# Patient Record
Sex: FEMALE | Race: WHITE | NOT HISPANIC OR LATINO | Employment: STUDENT | URBAN - NONMETROPOLITAN AREA
[De-identification: names, ages, dates, MRNs, and addresses within clinical notes are randomized per-mention and may not be internally consistent; named-entity substitution may affect disease eponyms.]

---

## 2023-02-12 PROBLEM — F41.9 ANXIETY: Status: ACTIVE | Noted: 2023-02-12

## 2023-02-12 PROBLEM — R53.83 FATIGUE: Status: ACTIVE | Noted: 2023-02-12

## 2023-02-24 PROBLEM — F32.1 DEPRESSION, MAJOR, SINGLE EPISODE, MODERATE (MULTI): Status: ACTIVE | Noted: 2023-02-24

## 2023-03-21 ENCOUNTER — APPOINTMENT (OUTPATIENT)
Dept: PRIMARY CARE | Facility: CLINIC | Age: 28
End: 2023-03-21
Payer: COMMERCIAL

## 2023-04-05 ENCOUNTER — OFFICE VISIT (OUTPATIENT)
Dept: PRIMARY CARE | Facility: CLINIC | Age: 28
End: 2023-04-05
Payer: COMMERCIAL

## 2023-04-05 VITALS
DIASTOLIC BLOOD PRESSURE: 66 MMHG | SYSTOLIC BLOOD PRESSURE: 116 MMHG | OXYGEN SATURATION: 99 % | BODY MASS INDEX: 23.77 KG/M2 | HEIGHT: 61 IN | HEART RATE: 61 BPM | WEIGHT: 125.9 LBS

## 2023-04-05 DIAGNOSIS — J30.2 SEASONAL ALLERGIES: ICD-10-CM

## 2023-04-05 DIAGNOSIS — F41.9 ANXIETY: Primary | ICD-10-CM

## 2023-04-05 DIAGNOSIS — F32.1 DEPRESSION, MAJOR, SINGLE EPISODE, MODERATE (MULTI): ICD-10-CM

## 2023-04-05 PROCEDURE — 1036F TOBACCO NON-USER: CPT | Performed by: INTERNAL MEDICINE

## 2023-04-05 PROCEDURE — 99213 OFFICE O/P EST LOW 20 MIN: CPT | Performed by: INTERNAL MEDICINE

## 2023-04-05 RX ORDER — HYDROXYZINE HYDROCHLORIDE 25 MG/1
25 TABLET, FILM COATED ORAL 3 TIMES DAILY
Qty: 90 TABLET | Refills: 2 | Status: SHIPPED | OUTPATIENT
Start: 2023-04-05 | End: 2023-05-05

## 2023-04-05 RX ORDER — FLUOXETINE HYDROCHLORIDE 40 MG/1
40 CAPSULE ORAL DAILY
Qty: 30 CAPSULE | Refills: 5 | Status: SHIPPED | OUTPATIENT
Start: 2023-04-05 | End: 2023-10-02

## 2023-04-05 ASSESSMENT — ENCOUNTER SYMPTOMS
PALPITATIONS: 0
SHORTNESS OF BREATH: 0
NAUSEA: 0
CHEST TIGHTNESS: 0
RHINORRHEA: 1
ARTHRALGIAS: 0
VOMITING: 0
BLOOD IN STOOL: 0
BACK PAIN: 0
COUGH: 0
FATIGUE: 0
DIARRHEA: 0
ABDOMINAL PAIN: 0
WHEEZING: 0

## 2023-04-05 NOTE — PROGRESS NOTES
Subjective   Patient ID: Rose Hollingsworth is a 27 y.o. female who presents for No chief complaint on file..  HPI  She is here today for follow-up.  We last saw her on February 7 and had increased the dose of her fluoxetine from 20 mg daily up to 40 mg daily.  She states she has been doing so much better.  She states there are times when she feels a little bit anxious and wondered if there is something she could take at those times.  I am going to give her hydroxyzine and we talked about the medication and potential side effects.  She also continues to see her counselor regularly and I believe she is doing well with those sessions.  She also states she has been feeling some allergy type symptoms with itchy eyes and runny nose and sometimes itchy throat.  She states her mucus is clear.  We discussed how 1 can develop seasonal allergies as an adult and I am recommending over-the-counter medications which I will list in her depart summary.  Otherwise she is doing well and we decided we can safely see her back in approximately 6 months and sooner if any problems  Review of Systems   Constitutional:  Negative for fatigue.   HENT:  Positive for congestion and rhinorrhea.    Respiratory:  Negative for cough, chest tightness, shortness of breath and wheezing.    Cardiovascular:  Negative for chest pain, palpitations and leg swelling.   Gastrointestinal:  Negative for abdominal pain, blood in stool, diarrhea, nausea and vomiting.   Musculoskeletal:  Negative for arthralgias and back pain.     Objective   Physical Exam  Vitals and nursing note reviewed.   Constitutional:       General: She is not in acute distress.     Appearance: Normal appearance.   HENT:      Head: Normocephalic and atraumatic.   Eyes:      Conjunctiva/sclera: Conjunctivae normal.   Cardiovascular:      Rate and Rhythm: Normal rate and regular rhythm.      Heart sounds: Normal heart sounds.   Pulmonary:      Effort: No respiratory distress.      Breath  sounds: No wheezing.   Abdominal:      Palpations: Abdomen is soft.      Tenderness: There is no abdominal tenderness. There is no guarding.   Musculoskeletal:         General: No swelling. Normal range of motion.   Skin:     General: Skin is warm and dry.   Neurological:      General: No focal deficit present.      Mental Status: She is alert and oriented to person, place, and time.   Psychiatric:         Behavior: Behavior normal.       Assessment/Plan   Problem List Items Addressed This Visit          Other    Anxiety - Primary     -I gave her a prescription for hydroxyzine and we talked about side effects such as drowsiness.  She will let us know if she is not doing well         Depression, major, single episode, moderate (CMS/HCC)     -Doing well  -We will continue with counseling  -She will continue with fluoxetine 40 mg daily and we will see her back in 6 months         Seasonal allergies     -She will try over-the-counter nonsedating antihistamines or Flonase and call if they are not effective               Chely Gatica, DO

## 2023-04-05 NOTE — PATIENT INSTRUCTIONS
-We discussed your symptoms of seasonal allergies and I am recommending you try over-the-counter medicines such as Claritin which is also known as loratadine or Allegra which is also known as fexofenadine.  You can also try Flonase nasal spray as this can provide a lot of symptom relief for nasal stuffiness and runny nose.  You can even get the generic versions of these medicines as they are just as effective.  There is also a decongestant version of the Claritin and Allegra but you have to purchase these at the front counter with a 's license.  I would try the plain versions first and please let us know how things are going  -We would like to see you back in 6 months and please call sooner if things or not going well  
no

## 2023-04-05 NOTE — ASSESSMENT & PLAN NOTE
-I gave her a prescription for hydroxyzine and we talked about side effects such as drowsiness.  She will let us know if she is not doing well

## 2023-04-05 NOTE — ASSESSMENT & PLAN NOTE
-She will try over-the-counter nonsedating antihistamines or Flonase and call if they are not effective

## 2023-04-05 NOTE — ASSESSMENT & PLAN NOTE
-Doing well  -We will continue with counseling  -She will continue with fluoxetine 40 mg daily and we will see her back in 6 months

## 2024-03-06 ENCOUNTER — OFFICE VISIT (OUTPATIENT)
Dept: URGENT CARE | Facility: CLINIC | Age: 29
End: 2024-03-06
Payer: COMMERCIAL

## 2024-03-06 VITALS
SYSTOLIC BLOOD PRESSURE: 132 MMHG | WEIGHT: 130 LBS | HEIGHT: 61 IN | RESPIRATION RATE: 16 BRPM | BODY MASS INDEX: 24.55 KG/M2 | DIASTOLIC BLOOD PRESSURE: 74 MMHG | TEMPERATURE: 98.1 F | OXYGEN SATURATION: 98 % | HEART RATE: 98 BPM

## 2024-03-06 DIAGNOSIS — J01.90 ACUTE RHINOSINUSITIS: Primary | ICD-10-CM

## 2024-03-06 LAB
POC TRIPLEX FLU A-AG: NORMAL
POC TRIPLEX FLU B-AG: NORMAL
POC TRIPLEX SARSCOV-2 AG: NORMAL

## 2024-03-06 PROCEDURE — 99212 OFFICE O/P EST SF 10 MIN: CPT | Performed by: PHYSICIAN ASSISTANT

## 2024-03-06 PROCEDURE — 87428 SARSCOV & INF VIR A&B AG IA: CPT | Performed by: PHYSICIAN ASSISTANT

## 2024-03-06 RX ORDER — IPRATROPIUM BROMIDE 42 UG/1
2 SPRAY, METERED NASAL 4 TIMES DAILY
Qty: 15 ML | Refills: 0 | Status: SHIPPED | OUTPATIENT
Start: 2024-03-06 | End: 2024-03-13

## 2024-03-06 NOTE — PROGRESS NOTES
[facility] NOTE:      Name: Rose Hollingsworth, 28 y.o.    CSN:[id number]   MRN:[id number]    PCP: Chely Gatica, DO    ALL:    Allergies   Allergen Reactions    Morphine Hives       History:    Chief Complaint: Sore Throat (SORE THROAT AND CONGESTION.//CONGESTION FOR 3 DAYS AND SORE THROAT SENSE YESTERDAY.)    Encounter Date: 3/6/2024  10:12hrs    HPI: The history was obtained from the patient. Rose is a 28 y.o. female, who presents with a chief complaint of Sore Throat (SORE THROAT AND CONGESTION.//CONGESTION FOR 3 DAYS AND SORE THROAT SENSE YESTERDAY.)     She has been drinking plenty of fluids, has held back on using any other medication.    She has some sinus congestion and some posterior drainage at times, she denies any notable exertional dyspnea, fever or chills.    PMHx:    Past Medical History:   Diagnosis Date    Calculus of kidney     Multiple kidney stones    Gestational diabetes mellitus in pregnancy, diet controlled 10/31/2022    Diet controlled gestational diabetes mellitus (GDM) in third trimester    Unspecified pre-eclampsia, third trimester 10/31/2022    Pre-eclampsia in third trimester              Current Outpatient Medications   Medication Sig Dispense Refill    FLUoxetine (PROzac) 40 mg capsule Take 1 capsule (40 mg) by mouth once daily. 30 capsule 5    hydrOXYzine HCL (Atarax) 25 mg tablet Take 1 tablet (25 mg) by mouth in the morning and 1 tablet (25 mg) in the evening and 1 tablet (25 mg) before bedtime. 90 tablet 2    ipratropium (Atrovent) 42 mcg (0.06 %) nasal spray Administer 2 sprays into each nostril 4 times a day for 7 days. 15 mL 0    MULTIVITAMIN ORAL Take by mouth.      norethindrone (Micronor) 0.35 mg tablet Take 1 tablet (0.35 mg) by mouth once daily.       No current facility-administered medications for this visit.         PMSx:    Past Surgical History:   Procedure Laterality Date    OTHER SURGICAL HISTORY  11/19/2019    Cystoscopy        Fam Hx:   Family History   Problem Relation Name Age of Onset    Heart disease Mother      Heart block Mother      Anxiety disorder Mother      Diabetes type II Mother      Heart disease Father      Heart block Father      COPD Other Grandparent     Anxiety disorder Other Grandparent     Emphysema Other Grandparent     Diabetes type II Other Grandparent        SOC. Hx:     Social History     Socioeconomic History    Marital status: Single     Spouse name: Not on file    Number of children: Not on file    Years of education: Not on file    Highest education level: Not on file   Occupational History    Not on file   Tobacco Use    Smoking status: Never    Smokeless tobacco: Never   Substance and Sexual Activity    Alcohol use: Not Currently    Drug use: Not Currently    Sexual activity: Not on file   Other Topics Concern    Not on file   Social History Narrative    Not on file     Social Determinants of Health     Financial Resource Strain: Not on file   Food Insecurity: Not on file   Transportation Needs: Not on file   Physical Activity: Not on file   Stress: Not on file   Social Connections: Not on file   Intimate Partner Violence: Not on file   Housing Stability: Not on file         Vitals:    03/06/24 0936   BP: 132/74   Pulse: 98   Resp: 16   Temp: 36.7 °C (98.1 °F)   SpO2: 98%     59 kg (130 lb)          Physical Exam  Constitutional:       Appearance: Normal appearance. She is normal weight.   HENT:      Head: Normocephalic and atraumatic.      Nose: Congestion present.      Right Turbinates: Enlarged and swollen.      Left Turbinates: Enlarged and swollen.      Mouth/Throat:      Mouth: Mucous membranes are moist.      Dentition: No gum lesions.      Palate: No lesions.      Pharynx: Uvula midline. Posterior oropharyngeal erythema present. No pharyngeal swelling.   Eyes:      Extraocular Movements: Extraocular movements intact.   Cardiovascular:      Rate and Rhythm: Normal rate and regular rhythm.    Pulmonary:      Effort: Pulmonary effort is normal.      Breath sounds: Normal breath sounds. No decreased breath sounds, wheezing or rhonchi.   Abdominal:      General: Abdomen is flat.   Musculoskeletal:         General: Normal range of motion.      Cervical back: Normal range of motion and neck supple.      Comments: Grossly normal strength   Skin:     General: Skin is warm.      Capillary Refill: Capillary refill takes less than 2 seconds.      Findings: No rash or wound.   Neurological:      Mental Status: She is alert and oriented to person, place, and time.   Psychiatric:         Behavior: Behavior normal.         LABORATORY @ RADIOLOGICAL IMAGING (if done):     Results for orders placed or performed in visit on 03/06/24 (from the past 24 hour(s))   POCT BD Veritor Triplex Ag   Result Value Ref Range    POC Triplex SARS-CoV-2 Ag  Presumptive negative for Triplex SARS-CoV-2 (no antigen detected)     Presumptive negative for Triplex SARS-CoV-2 (no antigen detected)    POC Triplex Flu A-Ag  Presumptive negative for Triplex FLU A (no antigen detected)     Presumptive negative for Triplex FLU A (no antigen detected)    POC Triplex Flu B-Ag  Presumptive negative for Triplex FLU B (no antigen detected)     Presumptive negative for Triplex FLU B (no antigen detected)        COURSE/MEDICAL DECISION MAKING:    Rose is a 28 y.o., who presents with a working diagnosis of   1. Acute rhinosinusitis     with a differential to include: Influenza, parainfluenza, rhinovirus, adenovirus, metapneumovirus, coronavirus, COVID-19, postnasal drip, strep pharyngitis, GERD, retropharyngeal abscess, tonsillitis, adenitis, seasonal allergies    1) URI with cough/congestion: supportive care recommended, discussed use of OTC analgesics APAP/NSAID for fever/pain control, discussed hydration & when to seek re-evaluation.        Jordan Walters PA-C   Advanced Practice Provider  [facility]

## 2024-07-17 ENCOUNTER — APPOINTMENT (OUTPATIENT)
Dept: PRIMARY CARE | Facility: CLINIC | Age: 29
End: 2024-07-17
Payer: COMMERCIAL

## 2024-07-17 VITALS
HEART RATE: 84 BPM | HEIGHT: 61 IN | WEIGHT: 128.06 LBS | BODY MASS INDEX: 24.18 KG/M2 | DIASTOLIC BLOOD PRESSURE: 74 MMHG | SYSTOLIC BLOOD PRESSURE: 120 MMHG

## 2024-07-17 DIAGNOSIS — Z00.00 ENCOUNTER FOR WELL ADULT EXAM WITHOUT ABNORMAL FINDINGS: ICD-10-CM

## 2024-07-17 DIAGNOSIS — Z13.220 LIPID SCREENING: Primary | ICD-10-CM

## 2024-07-17 PROCEDURE — 1036F TOBACCO NON-USER: CPT | Performed by: INTERNAL MEDICINE

## 2024-07-17 PROCEDURE — 99395 PREV VISIT EST AGE 18-39: CPT | Performed by: INTERNAL MEDICINE

## 2024-07-17 PROCEDURE — 3008F BODY MASS INDEX DOCD: CPT | Performed by: INTERNAL MEDICINE

## 2024-07-17 RX ORDER — SERTRALINE HYDROCHLORIDE 50 MG/1
50 TABLET, FILM COATED ORAL DAILY
COMMUNITY

## 2024-07-17 ASSESSMENT — ENCOUNTER SYMPTOMS
ARTHRALGIAS: 0
NAUSEA: 0
PALPITATIONS: 0
UNEXPECTED WEIGHT CHANGE: 0
FATIGUE: 0
DIARRHEA: 0
ABDOMINAL PAIN: 0
COUGH: 0
BACK PAIN: 0
SHORTNESS OF BREATH: 0
VOMITING: 0
WHEEZING: 0
CHEST TIGHTNESS: 0
BLOOD IN STOOL: 0

## 2024-07-17 NOTE — PROGRESS NOTES
Subjective   Patient ID: Rose Hollingsworth is a 28 y.o. female who presents for No chief complaint on file..  HPI  She is here today for a wellness visit.  We last saw her in April 2023.  She is looking well and she also reports feeling well.  She is seeing a neuropsychiatric nurse and she has been switched from fluoxetine to sertraline.  She states she is doing well but she and her gynecologist are looking into the possibility of PMDD.  We did conduct a review of systems and overall she appears quite healthy.  On today's physical exam we did find a mole at the lower portion of the mid back region.  She states has been there for many years but because of its location it is difficult to monitor.  I then proceeded to take a photo with her phone and I put a millimeter rule next to it so that she can use this for a point of reference and examining her mole in the future.  I told her that we generally recommend checking moles once a month and if she sees any changes she would simply call and we can refer her to dermatology.  We also realized that she has never had a cholesterol determination so I have ordered a screening lipid profile and have agreed to contact her with results.  Otherwise if everything goes according to plan she would simply come in once a year for her wellness checkup.  Review of Systems   Constitutional:  Negative for fatigue and unexpected weight change.   Respiratory:  Negative for cough, chest tightness, shortness of breath and wheezing.    Cardiovascular:  Negative for chest pain, palpitations and leg swelling.   Gastrointestinal:  Negative for abdominal pain, blood in stool, diarrhea, nausea and vomiting.   Musculoskeletal:  Negative for arthralgias and back pain.     Objective   Physical Exam  Vitals and nursing note reviewed.   Constitutional:       General: She is not in acute distress.     Appearance: Normal appearance.   HENT:      Head: Normocephalic and atraumatic.   Eyes:       Conjunctiva/sclera: Conjunctivae normal.   Cardiovascular:      Rate and Rhythm: Normal rate and regular rhythm.      Heart sounds: Normal heart sounds.   Pulmonary:      Effort: No respiratory distress.      Breath sounds: No wheezing.   Abdominal:      Palpations: Abdomen is soft.      Tenderness: There is no abdominal tenderness. There is no guarding.   Musculoskeletal:         General: No swelling. Normal range of motion.   Skin:     General: Skin is warm and dry.   Neurological:      General: No focal deficit present.      Mental Status: She is alert and oriented to person, place, and time.   Psychiatric:         Behavior: Behavior normal.       Assessment/Plan   Problem List Items Addressed This Visit             ICD-10-CM    Lipid screening - Primary Z13.220    Relevant Orders    Lipid panel          Chely Gatica DO

## 2024-07-17 NOTE — PATIENT INSTRUCTIONS
As we discussed we have treated today's visit as a wellness visit and you appear to be doing quite well  Please keep an eye on your moles and freckles and if you see any changes occurring please let me know as we would refer you to dermatology  I also ordered a fasting lipid profile so please try to go at your earliest convenience and once results are known I will contact you.  If you have abnormalities with your cholesterol I will give you advice on dietary changes and we would want to check it at some point in the future.  Otherwise if it looks great you will not need another checkup on your cholesterol for 5 years.

## 2024-07-18 ENCOUNTER — LAB (OUTPATIENT)
Dept: LAB | Facility: LAB | Age: 29
End: 2024-07-18
Payer: COMMERCIAL

## 2024-07-18 ENCOUNTER — OFFICE VISIT (OUTPATIENT)
Dept: PRIMARY CARE | Facility: CLINIC | Age: 29
End: 2024-07-18
Payer: COMMERCIAL

## 2024-07-18 VITALS
HEART RATE: 84 BPM | OXYGEN SATURATION: 98 % | SYSTOLIC BLOOD PRESSURE: 156 MMHG | HEIGHT: 61 IN | BODY MASS INDEX: 24.17 KG/M2 | DIASTOLIC BLOOD PRESSURE: 102 MMHG | WEIGHT: 128 LBS

## 2024-07-18 DIAGNOSIS — Z13.220 LIPID SCREENING: ICD-10-CM

## 2024-07-18 DIAGNOSIS — R51.9 ACUTE NONINTRACTABLE HEADACHE, UNSPECIFIED HEADACHE TYPE: ICD-10-CM

## 2024-07-18 DIAGNOSIS — R03.0 ELEVATED BLOOD PRESSURE READING: Primary | ICD-10-CM

## 2024-07-18 PROBLEM — I15.8 OTHER SECONDARY HYPERTENSION: Status: ACTIVE | Noted: 2024-07-18

## 2024-07-18 LAB
CHOLEST SERPL-MCNC: 162 MG/DL (ref 0–199)
CHOLESTEROL/HDL RATIO: 3.3
HDLC SERPL-MCNC: 49 MG/DL
LDLC SERPL CALC-MCNC: 103 MG/DL
NON HDL CHOLESTEROL: 113 MG/DL (ref 0–149)
TRIGL SERPL-MCNC: 51 MG/DL (ref 0–149)
VLDL: 10 MG/DL (ref 0–40)

## 2024-07-18 PROCEDURE — 3008F BODY MASS INDEX DOCD: CPT | Performed by: INTERNAL MEDICINE

## 2024-07-18 PROCEDURE — 1036F TOBACCO NON-USER: CPT | Performed by: INTERNAL MEDICINE

## 2024-07-18 PROCEDURE — 80061 LIPID PANEL: CPT

## 2024-07-18 PROCEDURE — 99213 OFFICE O/P EST LOW 20 MIN: CPT | Performed by: INTERNAL MEDICINE

## 2024-07-18 PROCEDURE — 3077F SYST BP >= 140 MM HG: CPT | Performed by: INTERNAL MEDICINE

## 2024-07-18 PROCEDURE — 3080F DIAST BP >= 90 MM HG: CPT | Performed by: INTERNAL MEDICINE

## 2024-07-18 PROCEDURE — 36415 COLL VENOUS BLD VENIPUNCTURE: CPT

## 2024-07-18 RX ORDER — ATENOLOL 25 MG/1
25 TABLET ORAL DAILY
Qty: 30 TABLET | Refills: 5 | Status: SHIPPED | OUTPATIENT
Start: 2024-07-18 | End: 2025-01-14

## 2024-07-18 NOTE — PROGRESS NOTES
Subjective   Patient ID: Rose Hollingsworth is a 28 y.o. female who presents for Hypertension.  She is here today for evaluation because today she started feeling a headache and when she checked her blood pressure at home it was significantly elevated.  Today her blood pressure is high for us as well.  Ironically we saw her yesterday and her blood pressure was fine.  She does remind me that several years ago she did develop preeclampsia but after her delivery she was fine.  She states that she does occasionally get headaches and they really bother her.  We talked about blood pressure causing headaches or a headache causing the blood pressure to go up.  I have decided to give her a low-dose of atenolol as I feel this would help several things.  It would help with headaches and help with today's blood pressure as well as help with possibly a little bit of anxiety.  She does admit to feeling a little anxious today.  She is no longer in the family-planning stages and we will have her give us an update tomorrow and see her back in a couple of weeks after being on the new medication.    Objective   Physical Exam  Vitals and nursing note reviewed.   Constitutional:       General: She is not in acute distress.     Appearance: Normal appearance.   HENT:      Head: Normocephalic and atraumatic.   Eyes:      Conjunctiva/sclera: Conjunctivae normal.   Cardiovascular:      Rate and Rhythm: Normal rate and regular rhythm.      Heart sounds: Normal heart sounds.   Pulmonary:      Effort: No respiratory distress.      Breath sounds: No wheezing.   Abdominal:      Palpations: Abdomen is soft.      Tenderness: There is no abdominal tenderness. There is no guarding.   Musculoskeletal:         General: No swelling. Normal range of motion.   Skin:     General: Skin is warm and dry.   Neurological:      General: No focal deficit present.      Mental Status: She is alert and oriented to person, place, and time.   Psychiatric:          Behavior: Behavior normal.       Assessment/Plan   Problem List Items Addressed This Visit             ICD-10-CM    Elevated blood pressure reading - Primary R03.0     -She will continue monitoring at home with her personal blood pressure monitor  -I am adding atenolol 25 mg daily in the hopes of preventing headaches and helping with blood pressure as well as anxiety         Relevant Medications    atenolol (Tenormin) 25 mg tablet    Other Relevant Orders    Basic Metabolic Panel    Acute nonintractable headache R51.9    Relevant Medications    atenolol (Tenormin) 25 mg tablet          Chely Gatica,

## 2024-07-18 NOTE — PATIENT INSTRUCTIONS
As we discussed I agree that your blood pressure is too high today and the question is did your headache because the high blood pressure or perhaps anxiety or are you having a headache because your blood pressure is high  Ironically your blood pressure was good yesterday  I will be giving you a prescription for atenolol 25 mg daily.  Atenolol is known as a beta-blocker and it can help quite significantly with preventing headaches as well as lowering blood pressure.  Atenolol can also help with anxiety.  Please start this medication today and give us an update tomorrow morning on your blood pressure reading.  I would like for you to sign up for [employer] and that way I can message you directly.  Please keep in mind that I will be doing messaging up until the noon hour tomorrow.  You can also call the office in the morning as well and leave a message  I would like to see you back in follow-up since we have this new medication and please do not hesitate to reach out if you are not doing well

## 2024-07-18 NOTE — RESULT ENCOUNTER NOTE
Please call Abbie and let her know that her cholesterol profile came back looking very good.  We could mail her a copy if she would like because it does not look like she is on the portal.  Please encourage her to sign up for the portal as she can then see her lab test results online.  If she has any questions about her cholesterol please encourage her to contact me.  Thank you

## 2024-07-18 NOTE — ASSESSMENT & PLAN NOTE
-She will continue monitoring at home with her personal blood pressure monitor  -I am adding atenolol 25 mg daily in the hopes of preventing headaches and helping with blood pressure as well as anxiety

## 2024-07-19 ENCOUNTER — LAB (OUTPATIENT)
Dept: LAB | Facility: LAB | Age: 29
End: 2024-07-19
Payer: COMMERCIAL

## 2024-07-19 DIAGNOSIS — R03.0 ELEVATED BLOOD PRESSURE READING: ICD-10-CM

## 2024-07-19 LAB
ANION GAP SERPL CALC-SCNC: 10 MMOL/L (ref 10–20)
BUN SERPL-MCNC: 15 MG/DL (ref 6–23)
CALCIUM SERPL-MCNC: 9.7 MG/DL (ref 8.6–10.3)
CHLORIDE SERPL-SCNC: 106 MMOL/L (ref 98–107)
CO2 SERPL-SCNC: 29 MMOL/L (ref 21–32)
CREAT SERPL-MCNC: 0.87 MG/DL (ref 0.5–1.05)
EGFRCR SERPLBLD CKD-EPI 2021: >90 ML/MIN/1.73M*2
GLUCOSE SERPL-MCNC: 84 MG/DL (ref 74–99)
POTASSIUM SERPL-SCNC: 4 MMOL/L (ref 3.5–5.3)
SODIUM SERPL-SCNC: 141 MMOL/L (ref 136–145)

## 2024-07-19 PROCEDURE — 80048 BASIC METABOLIC PNL TOTAL CA: CPT

## 2024-07-19 PROCEDURE — 36415 COLL VENOUS BLD VENIPUNCTURE: CPT

## 2024-07-30 DIAGNOSIS — I15.8 OTHER SECONDARY HYPERTENSION: Primary | ICD-10-CM

## 2024-07-30 RX ORDER — ATENOLOL 50 MG/1
50 TABLET ORAL DAILY
Qty: 30 TABLET | Refills: 5 | Status: SHIPPED | OUTPATIENT
Start: 2024-07-30 | End: 2024-08-02 | Stop reason: SDUPTHER

## 2024-07-31 ASSESSMENT — ENCOUNTER SYMPTOMS
BLURRED VISION: 0
NECK PAIN: 0
ORTHOPNEA: 0
HEADACHES: 0
PALPITATIONS: 0
HYPERTENSION: 1
SWEATS: 0
PND: 0
SHORTNESS OF BREATH: 0

## 2024-08-02 ENCOUNTER — APPOINTMENT (OUTPATIENT)
Age: 29
End: 2024-08-02
Payer: COMMERCIAL

## 2024-08-02 VITALS
DIASTOLIC BLOOD PRESSURE: 78 MMHG | SYSTOLIC BLOOD PRESSURE: 120 MMHG | OXYGEN SATURATION: 97 % | HEART RATE: 69 BPM | WEIGHT: 128 LBS | BODY MASS INDEX: 24.19 KG/M2

## 2024-08-02 DIAGNOSIS — G44.209 ACUTE NON INTRACTABLE TENSION-TYPE HEADACHE: Primary | ICD-10-CM

## 2024-08-02 DIAGNOSIS — I15.8 OTHER SECONDARY HYPERTENSION: ICD-10-CM

## 2024-08-02 PROBLEM — R03.0 ELEVATED BLOOD PRESSURE READING: Status: RESOLVED | Noted: 2024-07-18 | Resolved: 2024-08-02

## 2024-08-02 RX ORDER — ATENOLOL 50 MG/1
50 TABLET ORAL DAILY
Qty: 90 TABLET | Refills: 1 | Status: SHIPPED | OUTPATIENT
Start: 2024-08-02 | End: 2025-01-29

## 2024-08-02 ASSESSMENT — ENCOUNTER SYMPTOMS
SHORTNESS OF BREATH: 0
PALPITATIONS: 0
NECK PAIN: 0
HEADACHES: 0

## 2024-08-02 NOTE — PROGRESS NOTES
Subjective   Patient ID: Rose Hollingsworth is a 28 y.o. female who presents for Follow-up (New BP medication. Has alist).  She is here today for follow-up and accompanied by her daughter.  Since our last visit she started the atenolol and she has been keeping track of her blood pressure readings.  They are excellent.  We got an excellent reading today.  She states in the beginning she felt a little tired but as time has gone on her side effects are improving significantly.  What I am most pleased about as she has had no further headaches.  We decided to keep her medical regimen the same and I am providing a refill for 6 months.  We will see her back at about 6-month follow-up time and certainly sooner if any problems.  Review of Systems   Respiratory:  Negative for shortness of breath.    Cardiovascular:  Negative for chest pain and palpitations.   Musculoskeletal:  Negative for neck pain.   Neurological:  Negative for headaches.     Objective   Physical Exam  Vitals and nursing note reviewed.   Constitutional:       General: She is not in acute distress.     Appearance: Normal appearance.   HENT:      Head: Normocephalic and atraumatic.   Eyes:      Conjunctiva/sclera: Conjunctivae normal.   Cardiovascular:      Rate and Rhythm: Normal rate and regular rhythm.      Heart sounds: Normal heart sounds.   Pulmonary:      Effort: No respiratory distress.      Breath sounds: No wheezing.   Abdominal:      Palpations: Abdomen is soft.      Tenderness: There is no abdominal tenderness. There is no guarding.   Musculoskeletal:         General: No swelling. Normal range of motion.   Skin:     General: Skin is warm and dry.   Neurological:      General: No focal deficit present.      Mental Status: She is alert and oriented to person, place, and time.   Psychiatric:         Behavior: Behavior normal.       Assessment/Plan   Problem List Items Addressed This Visit             ICD-10-CM    Other secondary hypertension I15.8      -Doing very well on atenolol and we are providing a refill today         Relevant Medications    atenolol (Tenormin) 50 mg tablet    Acute nonintractable headache - Primary R51.9     -Headaches have resolved with the implementation of atenolol and we will continue to monitor               Chely Gatica, DO

## 2024-08-02 NOTE — PATIENT INSTRUCTIONS
As we discussed I am very pleased with your blood pressure today and I am also very pleased that your headaches have resolved  Please do not hesitate to reach out if you are having any problems and otherwise we would see you back in approximately 6 months for another checkup

## 2025-02-04 ENCOUNTER — APPOINTMENT (OUTPATIENT)
Age: 30
End: 2025-02-04
Payer: COMMERCIAL

## 2025-02-04 VITALS
BODY MASS INDEX: 24.28 KG/M2 | DIASTOLIC BLOOD PRESSURE: 74 MMHG | OXYGEN SATURATION: 98 % | SYSTOLIC BLOOD PRESSURE: 118 MMHG | WEIGHT: 128.6 LBS | HEART RATE: 68 BPM | HEIGHT: 61 IN

## 2025-02-04 DIAGNOSIS — F32.1 DEPRESSION, MAJOR, SINGLE EPISODE, MODERATE (MULTI): ICD-10-CM

## 2025-02-04 DIAGNOSIS — I15.8 OTHER SECONDARY HYPERTENSION: Primary | ICD-10-CM

## 2025-02-04 PROBLEM — R53.83 FATIGUE: Status: RESOLVED | Noted: 2023-02-12 | Resolved: 2025-02-04

## 2025-02-04 PROBLEM — R51.9 ACUTE NONINTRACTABLE HEADACHE: Status: RESOLVED | Noted: 2024-07-18 | Resolved: 2025-02-04

## 2025-02-04 PROBLEM — Z13.220 LIPID SCREENING: Status: RESOLVED | Noted: 2024-07-17 | Resolved: 2025-02-04

## 2025-02-04 PROCEDURE — 3078F DIAST BP <80 MM HG: CPT | Performed by: INTERNAL MEDICINE

## 2025-02-04 PROCEDURE — 3074F SYST BP LT 130 MM HG: CPT | Performed by: INTERNAL MEDICINE

## 2025-02-04 PROCEDURE — 99213 OFFICE O/P EST LOW 20 MIN: CPT | Performed by: INTERNAL MEDICINE

## 2025-02-04 PROCEDURE — 1036F TOBACCO NON-USER: CPT | Performed by: INTERNAL MEDICINE

## 2025-02-04 PROCEDURE — 3008F BODY MASS INDEX DOCD: CPT | Performed by: INTERNAL MEDICINE

## 2025-02-04 RX ORDER — ATOMOXETINE 60 MG/1
60 CAPSULE ORAL DAILY
COMMUNITY

## 2025-02-04 RX ORDER — ATENOLOL 50 MG/1
50 TABLET ORAL DAILY
Qty: 100 TABLET | Refills: 3 | Status: SHIPPED | OUTPATIENT
Start: 2025-02-04 | End: 2025-08-03

## 2025-02-04 ASSESSMENT — ENCOUNTER SYMPTOMS
BLOOD IN STOOL: 0
ABDOMINAL PAIN: 0
SHORTNESS OF BREATH: 0
DIARRHEA: 0
PALPITATIONS: 0
FATIGUE: 0
VOMITING: 0
WHEEZING: 0
COUGH: 0
NAUSEA: 0
BACK PAIN: 0
ARTHRALGIAS: 0
UNEXPECTED WEIGHT CHANGE: 0
CHEST TIGHTNESS: 0

## 2025-02-04 ASSESSMENT — PATIENT HEALTH QUESTIONNAIRE - PHQ9
1. LITTLE INTEREST OR PLEASURE IN DOING THINGS: NOT AT ALL
2. FEELING DOWN, DEPRESSED OR HOPELESS: NOT AT ALL
SUM OF ALL RESPONSES TO PHQ9 QUESTIONS 1 AND 2: 0

## 2025-02-04 NOTE — PROGRESS NOTES
Subjective   Patient ID: Rose Hollingsworth is a 29 y.o. female who presents for Follow-up (6 MO CK).  HPI  She is here today for her checkup.  Her blood pressure control is excellent and overall she is looking quite well.  We reviewed her medications and she continues to see a neuropsychiatric nurse via telemedicine for checkups and therapy as well as refills on her other medications.  She states she is checking her blood pressures regularly especially when any medications are being changed.  I decided that we could safely have her return in 1 year as long as she is continuing to monitor and she will contact us with any questions or concerns prior to her next visit.  We did conduct a review of systems.  Review of Systems   Constitutional:  Negative for fatigue and unexpected weight change.   Respiratory:  Negative for cough, chest tightness, shortness of breath and wheezing.    Cardiovascular:  Negative for chest pain, palpitations and leg swelling.   Gastrointestinal:  Negative for abdominal pain, blood in stool, diarrhea, nausea and vomiting.   Musculoskeletal:  Negative for arthralgias and back pain.     Objective   Physical Exam  Vitals and nursing note reviewed.   Constitutional:       General: She is not in acute distress.     Appearance: Normal appearance.   HENT:      Head: Normocephalic and atraumatic.   Eyes:      Conjunctiva/sclera: Conjunctivae normal.   Cardiovascular:      Rate and Rhythm: Normal rate and regular rhythm.      Heart sounds: Normal heart sounds.   Pulmonary:      Effort: No respiratory distress.      Breath sounds: No wheezing.   Abdominal:      Palpations: Abdomen is soft.      Tenderness: There is no abdominal tenderness. There is no guarding.   Musculoskeletal:         General: No swelling. Normal range of motion.   Skin:     General: Skin is warm and dry.   Neurological:      General: No focal deficit present.      Mental Status: She is alert and oriented to person, place, and time.    Psychiatric:         Behavior: Behavior normal.       Assessment/Plan   Problem List Items Addressed This Visit             ICD-10-CM    Depression, major, single episode, moderate (Multi) F32.1     -She sees a provider regularly for checkups and is receiving refills from her provider  -Doing well at this time         Other secondary hypertension - Primary I15.8     -Blood pressure remains under good control so we are providing a refill on medication for 1 year she will continue to monitor blood pressures periodically and contact us with any issues  -         Relevant Medications    atenolol (Tenormin) 50 mg tablet   Patient instructions  As I discussed I have decided to send a full 1 year refill under Tenormin since you are doing so well.  Please keep up the great work with monitoring your blood pressure from time to time and please reach out if you are having issues with blood pressure control.         Chely Gatica, DO

## 2025-02-04 NOTE — PATIENT INSTRUCTIONS
Patient instructions  As I discussed I have decided to send a full 1 year refill under Tenormin since you are doing so well.  Please keep up the great work with monitoring your blood pressure from time to time and please reach out if you are having issues with blood pressure control.

## 2025-02-04 NOTE — ASSESSMENT & PLAN NOTE
-Blood pressure remains under good control so we are providing a refill on medication for 1 year she will continue to monitor blood pressures periodically and contact us with any issues  -

## 2025-02-04 NOTE — ASSESSMENT & PLAN NOTE
-She sees a provider regularly for checkups and is receiving refills from her provider  -Doing well at this time

## 2025-03-27 ENCOUNTER — OFFICE VISIT (OUTPATIENT)
Dept: URGENT CARE | Facility: CLINIC | Age: 30
End: 2025-03-27
Payer: COMMERCIAL

## 2025-03-27 VITALS
HEIGHT: 61 IN | DIASTOLIC BLOOD PRESSURE: 94 MMHG | WEIGHT: 128 LBS | RESPIRATION RATE: 20 BRPM | SYSTOLIC BLOOD PRESSURE: 130 MMHG | OXYGEN SATURATION: 99 % | HEART RATE: 77 BPM | TEMPERATURE: 97.3 F | BODY MASS INDEX: 24.17 KG/M2

## 2025-03-27 DIAGNOSIS — J20.9 ACUTE BRONCHITIS, UNSPECIFIED ORGANISM: Primary | ICD-10-CM

## 2025-03-27 PROCEDURE — 99213 OFFICE O/P EST LOW 20 MIN: CPT | Performed by: NURSE PRACTITIONER

## 2025-03-27 RX ORDER — ALBUTEROL SULFATE 90 UG/1
2 INHALANT RESPIRATORY (INHALATION) EVERY 6 HOURS PRN
Qty: 18 G | Refills: 0 | Status: SHIPPED | OUTPATIENT
Start: 2025-03-27 | End: 2026-03-27

## 2025-03-27 RX ORDER — AZITHROMYCIN 250 MG/1
TABLET, FILM COATED ORAL
Qty: 6 TABLET | Refills: 0 | Status: SHIPPED | OUTPATIENT
Start: 2025-03-27 | End: 2025-04-01

## 2025-03-27 RX ORDER — PREDNISONE 10 MG/1
TABLET ORAL
Qty: 21 TABLET | Refills: 0 | Status: SHIPPED | OUTPATIENT
Start: 2025-03-27 | End: 2025-04-02

## 2025-03-27 NOTE — PROGRESS NOTES
I was present with the APRN student who participated in the documentation of this note. I have personally seen and re-examined the patient and performed the medical decision-making components (assessment and plan of care). I have reviewed the APRN student documentation and verified the findings in the note as written with additions or exceptions as stated in the body of this note.

## 2025-03-27 NOTE — PROGRESS NOTES
29 y.o. female presents for evaluation of URI.  Symptoms including cough, congestion, body aches, sore throat, malaise that have been present for 5 days and refractory to OTC meds.  No rashes, ear pain, nausea, vomiting, abdominal pain, CP, or SOB. No none ill contacts.    Vitals:    25 0920   BP: (!) 130/94   Pulse: 77   Resp: 20   Temp: 36.3 °C (97.3 °F)   SpO2: 99%       Allergies   Allergen Reactions    Morphine Hives       Medication Documentation Review Audit       Reviewed by Linda Kirby MA (Medical Assistant) on 25 at 0919      Medication Order Taking? Sig Documenting Provider Last Dose Status   atenolol (Tenormin) 50 mg tablet [id number] Yes Take 1 tablet (50 mg) by mouth once daily. Chely Gatica, DO  Active   atomoxetine (Strattera) 60 mg capsule [id number] Yes Take 1 capsule (60 mg) by mouth once daily. Swallow capsule whole; do not open. If opened accidentally, do not touch eyes; wash hands immediately (product is an eye irritant). Historical Provider, MD  Active   ipratropium (Atrovent) 42 mcg (0.06 %) nasal spray [id number]  Administer 2 sprays into each nostril 4 times a day for 7 days. Jordan Walters PA-C   25 2354   sertraline (Zoloft) 50 mg tablet [id number] Yes Take 2 tablets (100 mg) by mouth once daily. Historical Provider, MD Taking Active                    Past Medical History:   Diagnosis Date    Calculus of kidney     Multiple kidney stones    Gestational diabetes mellitus in pregnancy, diet controlled ([facility]) 10/31/2022    Diet controlled gestational diabetes mellitus (GDM) in third trimester    Unspecified pre-eclampsia, third trimester ([facility]) 10/31/2022    Pre-eclampsia in third trimester       Past Surgical History:   Procedure Laterality Date    OTHER SURGICAL HISTORY  2019    Cystoscopy       ROS  See HPI    Physical Exam  Constitutional:       Appearance: She is ill-appearing (mildly).   HENT:      Head: Normocephalic and atraumatic.       Nose: Congestion and rhinorrhea present.      Mouth/Throat:      Mouth: Mucous membranes are moist.   Eyes:      Pupils: Pupils are equal, round, and reactive to light.   Cardiovascular:      Pulses: Normal pulses.      Heart sounds: Normal heart sounds.   Pulmonary:      Effort: Pulmonary effort is normal.      Breath sounds: Rales present.      Comments: Moist cough noted during exam  Skin:     General: Skin is warm and dry.   Neurological:      General: No focal deficit present.      Mental Status: She is alert and oriented to person, place, and time.   Psychiatric:         Mood and Affect: Mood normal.         Behavior: Behavior normal.           Assessment/Plan/MDM  Rose was seen today for uri.  Diagnoses and all orders for this visit:  Acute bronchitis, unspecified organism (Primary)  -     azithromycin (Zithromax Z-Jignesh) 250 mg tablet; Take 2 tablets (500 mg) on  Day 1,  followed by 1 tablet (250 mg) once daily on Days 2 through 5.  -     predniSONE (Deltasone) 10 mg tablet; Take 6 tablets (60 mg) by mouth once daily for 1 day, THEN 5 tablets (50 mg) once daily for 1 day, THEN 4 tablets (40 mg) once daily for 1 day, THEN 3 tablets (30 mg) once daily for 1 day, THEN 2 tablets (20 mg) once daily for 1 day, THEN 1 tablet (10 mg) once daily for 1 day.  -     albuterol 90 mcg/actuation inhaler; Inhale 2 puffs every 6 hours if needed for wheezing.    Encouraged pt to use otc cold remedies PRN, push PO fluids and rest. Patient's clinical presentation is otherwise unremarkable at this time. Patient is discharged with instructions to follow-up with primary care or seek emergency medical attention for worsening symptoms or any new concerns.    I did personally review Rose's past medical history, surgical history, social history, as well as family history (when relevant).  In this case, I also oversaw the her drug management by reviewing her medication list, allergy list, as well as the medications that I  prescribed during the UC course and/or recommended as an out-patient (including possible OTC medications such as acetaminophen, NSAIDs , etc).    After reviewing the items above, I did look at previous medical documentation, such as recent hospitalizations, office visits, and/or recent consultations with PCP/specialist.                          SDOH:   Another factor that I considered in Rose's care was her Social Determinants of Health (SDOH). During this UC encounter, she did not have social determinants of health. Those SDOH influencing Rose's care are: none      Zay Connelly CNP  [facility]  [phone]

## 2026-02-04 ENCOUNTER — APPOINTMENT (OUTPATIENT)
Age: 31
End: 2026-02-04
Payer: COMMERCIAL